# Patient Record
Sex: MALE | Race: OTHER | NOT HISPANIC OR LATINO | Employment: PART TIME | URBAN - METROPOLITAN AREA
[De-identification: names, ages, dates, MRNs, and addresses within clinical notes are randomized per-mention and may not be internally consistent; named-entity substitution may affect disease eponyms.]

---
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
## 2018-01-07 ENCOUNTER — OCCUPATIONAL MEDICINE (OUTPATIENT)
Dept: URGENT CARE | Facility: PHYSICIAN GROUP | Age: 18
End: 2018-01-07
Payer: MEDICARE

## 2018-01-07 VITALS
WEIGHT: 196.6 LBS | OXYGEN SATURATION: 95 % | SYSTOLIC BLOOD PRESSURE: 128 MMHG | HEART RATE: 86 BPM | HEIGHT: 68 IN | BODY MASS INDEX: 29.8 KG/M2 | TEMPERATURE: 99.2 F | DIASTOLIC BLOOD PRESSURE: 70 MMHG

## 2018-01-07 DIAGNOSIS — T23.161A BURN OF FIRST DEGREE OF BACK OF RIGHT HAND, INITIAL ENCOUNTER: ICD-10-CM

## 2018-01-07 PROCEDURE — 16000 INITIAL TREATMENT OF BURN(S): CPT | Mod: 29 | Performed by: PHYSICIAN ASSISTANT

## 2018-01-07 NOTE — PROGRESS NOTES
"Subjective:      Negrito Rivera is a 17 y.o. male who presents with Burn (R Hand burned w/coffee, occured today )      DOI: 1/7/8.  KEI: burnt right hand on hot coffee.  Right hand dominant.  Burned by hot coffee water and hot grounds.  On and off again pain.       HPI    Review of Systems   Skin:        Burn to hand   All other systems reviewed and are negative.         Objective:     /70   Pulse 86   Temp 37.3 °C (99.2 °F)   Ht 1.727 m (5' 7.99\")   Wt 89.2 kg (196 lb 9.6 oz)   SpO2 95%   BMI 29.90 kg/m²      Physical Exam  Nursing note and vital signs reviewed.    Constitutional:  Appropriately groomed, pleasant affect, well nourished, and in no acute distress.    HEENT:  Head: Atraumatic, normocephalic.    Eyes:  EOMs full.  Conjunctivae clear, sclera white, and medial canthus without exudate bilaterally.    Ears:  Hearing grossly intact to voice.    Neck:  FROM.  No anterior cervical chain lymphadenopathy. Thyroid nonpalpable, without masses or nodules. No supraclavicular lymphadenopathy to palpation.    Lungs:  Lungs with normal respiratory excursion and effort.      Muscle skeletal:  Gait and station wnl, non antalgic      Derm:  1 st degree burn over right hand on the dorsal aspect and base of index, middle, and ring.  Overall good turgor pressure.   Psychiatric:  Normal judgement, mood and affect.        Assessment/Plan:     1. Burn of first degree of back of right hand, initial encounter  First-degree burn to right hand dorsal aspect involving thenar eminence, base of first seconds and third fingers. Excludes little finger. Covered with Silvadene and Telfa and wrapped with rolled gauze and secured with Ace bandage. Recommended seeing oxide starting tomorrow changing twice daily. Keep covered. Work restrictions per D39. Patient to follow-up Wednesday or Thursday for reevaluation. Suspect discharge at that time.    Patient was in agreement with this treatment plan and seemed to understand " without barriers. All questions were encouraged and answered.  Reviewed signs and symptoms of when to seek emergency medical care.     Please note that this dictation was created using voice recognition software.  I have made every reasonable attempt to correct obvious errors, but I expect there are errors of valente and possibly content that I did not discover before finalizing the note.

## 2018-01-07 NOTE — LETTER
"EMPLOYEE’S CLAIM FOR COMPENSATION/ REPORT OF INITIAL TREATMENT  FORM C-4    EMPLOYEE’S CLAIM - PROVIDE ALL INFORMATION REQUESTED   First Name  Negrito Last Name  Miguel Birthdate                    2000                Sex  male Claim Number   Home Address  [place]  Age  17 y.o. Height  1.727 m (5' 7.99\") Weight  89.2 kg (196 lb 9.6 oz) [facility]     [facility] Zip  [address] Telephone  [phone] (home)    [place]  [facility] Zip  [address] Primary Language Spoken  English    Insurer   Third Party   [employer] Insurance   Employee's Occupation (Job Title) When Injury or Occupational Disease Occurred  [employer]    Employer's Name    [employer] Telephone  [phone]    Employer Address  [address]  [address]    Date of Injury  1/7/2018               Hour of Injury  9:00 AM Date Employer Notified  7/7/2018 Last Day of Work after Injury or Occupational Disease  1/7/2018 Supervisor to Whom Injury Reported  Darryl   Address or Location of Accident (if applicable)  [[facility]]   What were you doing at the time of accident? (if applicable)  Making coffee    How did this injury or occupational disease occur? (Be specific an answer in detail. Use additional sheet if necessary)  Spilled hot water on my hand    If you believe that you have an occupational disease, when did you first have knowledge of the disability and it relationship to your employment?  n/a Witnesses to the Accident  Sofi      Nature of Injury or Occupational Disease  Burn  Part(s) of Body Injured or Affected  Hand (R), ,     I certify that the above is true and correct to the best of my knowledge and that I have provided this information in order to obtain the benefits of [place] " inclusive or [place]).  I hereby authorize any physician, chiropractor, surgeon, practitioner, or other person, any hospital, including [facility] or [facility] hospital, any medical service organization, any insurance company, or other institution or organization to release to each other, any medical or other information, including benefits paid or payable, pertinent to this injury or disease, except information relative to diagnosis, treatment and/or counseling for AIDS, psychological conditions, alcohol or controlled substances, for which I must give specific authorization.  A Photostat of this authorization shall be as valid as the original.     Date   Place   Employee’s Signature   THIS REPORT MUST BE COMPLETED AND MAILED WITHIN 3 WORKING DAYS OF TREATMENT   Place  [facility]  Name of Facility  [place]   Date  1/7/2018 Diagnosis  (T23.161A) Burn of first degree of back of right hand, initial encounter Is there evidence the injured employee was under the influence of alcohol and/or another controlled substance at the time of accident?   Hour  1:15 PM Description of Injury or Disease  The encounter diagnosis was Burn of first degree of back of right hand, initial encounter. No   Treatment  Keep covered with zinc oxide.   Have you advised the patient to remain off work five days or more? No   X-Ray Findings      If Yes   From Date  To Date      From information given by the employee, together with medical evidence, can you directly connect this injury or occupational disease as job incurred?  Yes If No Full Duty  Yes Modified Duty  No   Is additional medical care by a physician indicated?  Yes If Modified Duty, Specify any Limitations / Restrictions  Minimal use of right hand.  No lifting with the right hand.   Do you know of any previous injury or disease contributing to this condition or occupational disease?                            No   Date  1/7/2018 Print  "Doctor’s Name Boston Raphael P.A.-C. I certify the employer’s copy of  this form was mailed on:   Address  [address] [place] [address]. [address] Insurer’s Use Only     [facility] Zip  [address]    Provider’s Tax ID Number  [id number] Telephone  Dept: [phone]        e-BOSTON Alas P.A.-C.   e-Signature: Dr. Juan Pickard, Medical Director Degree  AVELINO        ORIGINAL-TREATING PHYSICIAN OR CHIROPRACTOR    PAGE 2-INSURER/TPA    PAGE 3-EMPLOYER    PAGE 4-EMPLOYEE             Form C-4 (rev10/07)              BRIEF DESCRIPTION OF RIGHTS AND BENEFITS  (Pursuant to [place] 616C.050)    Notice of Injury or Occupational Disease (Incident Report Form C-1): If an injury or occupational disease (OD) arises out of and in the  course of employment, you must provide written notice to your employer as soon as practicable, but no later than 7 days after the accident or  OD. Your employer shall maintain a sufficient supply of the required forms.    Claim for Compensation (Form C-4): If medical treatment is sought, the form C-4 is available at the place of initial treatment. A completed  \"Claim for Compensation\" (Form C-4) must be filed within 90 days after an accident or OD. The treating physician or chiropractor must,  within 3 working days after treatment, complete and mail to the employer, the employer's insurer and third-party , the Claim for  Compensation.    Medical Treatment: If you require medical treatment for your on-the-job injury or OD, you may be required to select a physician or  chiropractor from a list provided by your workers’ compensation insurer, if it has contracted with an Organization for Managed Care (MCO) or  Preferred Provider Organization (PPO) or providers of health care. If your employer has not entered into a contract with an MCO or PPO, you  may select a physician or chiropractor from the Panel of Physicians and Chiropractors. Any medical costs related to your industrial injury " or  OD will be paid by your insurer.    Temporary Total Disability (TTD): If your doctor has certified that you are unable to work for a period of at least 5 consecutive days, or 5  cumulative days in a 20-day period, or places restrictions on you that your employer does not accommodate, you may be entitled to TTD  compensation.    Temporary Partial Disability (TPD): If the wage you receive upon reemployment is less than the compensation for TTD to which you are  entitled, the insurer may be required to pay you TPD compensation to make up the difference. TPD can only be paid for a maximum of 24  months.    Permanent Partial Disability (PPD): When your medical condition is stable and there is an indication of a PPD as a result of your injury or  OD, within 30 days, your insurer must arrange for an evaluation by a rating physician or chiropractor to determine the degree of your PPD. The  amount of your PPD award depends on the date of injury, the results of the PPD evaluation and your age and wage.    Permanent Total Disability (PTD): If you are medically certified by a treating physician or chiropractor as permanently and totally disabled  and have been granted a PTD status by your insurer, you are entitled to receive monthly benefits not to exceed 66 2/3% of your average  monthly wage. The amount of your PTD payments is subject to reduction if you previously received a PPD award.    Vocational Rehabilitation Services: You may be eligible for vocational rehabilitation services if you are unable to return to the job due to a  permanent physical impairment or permanent restrictions as a result of your injury or occupational disease.    Transportation and Per Esvin Reimbursement: You may be eligible for travel expenses and per esvin associated with medical treatment.    Reopening: You may be able to reopen your claim if your condition worsens after claim closure.    Appeal Process: If you disagree with a written  determination issued by the insurer or the insurer does not respond to your request, you may  appeal to the Department of Administration, , by following the instructions contained in your determination letter. You must  appeal the determination within 70 days from the date of the determination letter at [address], [address], [place], [place]  [address], or [address], [address], [place], [place] [address]. If you disagree with the  decision, you may appeal to the  Department of Administration, . You must file your appeal within 30 days from the date of the  decision  letter at [address], [address], [place], [place] [address], or [address], [address], [place], [place] [address]. If you  disagree with a decision of an , you may file a petition for judicial review with the District Court. You must do so within 30  days of the Appeal Officer’s decision. You may be represented by an  at your own expense or you may contact the [facility] for possible  representation.    [place]  for Injured Workers (NAIW): If you disagree with a  decision, you may request that NAIW represent you  without charge at an  Hearing. For information regarding denial of benefits, you may contact the [facility] at: [address], [address], [place] [address], [phone], or [address], [place] [address], [phone]    To File a Complaint with the Division: If you wish to file a complaint with the  of the Division of Industrial Relations (DIR),  please contact the Workers’ Compensation Section, [address], [address], [place], [place] [address], telephone [phone], or  [address], [facility] [address], [place] [address], telephone [phone].    For assistance with Workers’ Compensation Issues: you may contact the  Office of the Governor Consumer Health Assistance, [address] [place] [address], [address], [place] [address], Toll Free 1-922.366.5046, Web site: http://govcha.[facility].nv., E-mail  Libby@[email or web address].[email or web address].                                                                                                                                                                                                                                   __________________________________________________________________                                                                   _________________                Employee Name / Signature                                                                                                                                                       Date                                                                                                                                                                                                     D-2 (rev. 10/07)

## 2018-01-07 NOTE — LETTER
[facility]  [address] [place] [address]. [address] - [place] [address]  Phone:  [phone] - Fax:  [phone]   [facility] Progress Report and Disability Certification  Date of Service: 1/7/2018   No Show:  No  Date / Time of Next Visit: 1/11/2018   Claim Information   Patient Name: Negrito Rivera  Claim Number:     Employer:   [employer] Date of Injury: 1/7/2018     Insurer / TPA: [employer]  ID / SSN:     Occupation:   Diagnosis: The encounter diagnosis was Burn of first degree of back of right hand, initial encounter.    Medical Information   Related to Industrial Injury? Yes    Subjective Complaints:  DOI: 1/7/8.  KEI: burnt right hand on hot coffee.  Right hand dominant.  Burned by hot coffee water and hot grounds.  On and off again pain.     Objective Findings: Derm:  1 st degree burn over right hand on the dorsal aspect and base of index, middle, and ring.  Overall good turgor pressure.    Pre-Existing Condition(s):     Assessment:   Initial Visit    Status: Additional Care Required  Permanent Disability:No    Plan:   Comments:Keep covered with zinc oxide.      Diagnostics:      Comments:       Disability Information   Status:      From:  1/7/2018  Through: 1/11/2018 Restrictions are: Temporary   Physical Restrictions   Sitting:    Standing:    Stooping:    Bending:      Squatting:    Walking:    Climbing:    Pushing:      Pulling:    Other:    Reaching Above Shoulder (L):   Reaching Above Shoulder (R):       Reaching Below Shoulder (L):    Reaching Below Shoulder (R):      Not to exceed Weight Limits   Carrying(hrs):   Weight Limit(lb):   Lifting(hrs):   Weight  Limit(lb):     Comments: Minimal use of right hand.  No lifting with the right hand.    Repetitive Actions   Hands: i.e. Fine Manipulations from Grasping:     Feet: i.e. Operating Foot Controls:     Driving / Operate Machinery:     Physician Name: Boston Raphael  ALETA Physician Signature: ETHAN Fuentes P.A.-C. e-Signature: Dr. Juan Pickard, Medical Director   Clinic Name / Location: [address]  [place] [address] Clinic Phone Number: Dept: [phone]   Appointment Time: 12:25 Pm Visit Start Time: 1:15 PM   Check-In Time:  12:43 Pm Visit Discharge Time:  2:20pm   Original-Treating Physician or Chiropractor    Page 2-Insurer/TPA    Page 3-Employer    Page 4-Employee

## 2018-01-07 NOTE — PATIENT INSTRUCTIONS
Burn Care  Your skin is a natural barrier to infection. It is the largest organ of your body. Burns damage this natural protection. To help prevent infection, it is very important to follow your caregiver's instructions in the care of your burn.  Burns are classified as:  · First degree. There is only redness of the skin (erythema). No scarring is expected.  · Second degree. There is blistering of the skin. Scarring may occur with deeper burns.  · Third degree. All layers of the skin are injured, and scarring is expected.  HOME CARE INSTRUCTIONS   · Wash your hands well before changing your bandage.  · Change your bandage as often as directed by your caregiver.  ¨ Remove the old bandage. If the bandage sticks, you may soak it off with cool, clean water.  ¨ Cleanse the burn thoroughly but gently with mild soap and water.  ¨ Pat the area dry with a clean, dry cloth.  ¨ Apply a thin layer of antibacterial cream to the burn.  ¨ Apply a clean bandage as instructed by your caregiver.  ¨ Keep the bandage as clean and dry as possible.  · Elevate the affected area for the first 24 hours, then as instructed by your caregiver.  · Only take over-the-counter or prescription medicines for pain, discomfort, or fever as directed by your caregiver.  SEEK IMMEDIATE MEDICAL CARE IF:   · You develop excessive pain.  · You develop redness, tenderness, swelling, or red streaks near the burn.  · The burned area develops yellowish-white fluid (pus) or a bad smell.  · You have a fever.  MAKE SURE YOU:   · Understand these instructions.  · Will watch your condition.  · Will get help right away if you are not doing well or get worse.     This information is not intended to replace advice given to you by your health care provider. Make sure you discuss any questions you have with your health care provider.     Document Released: 12/18/2006 Document Revised: 03/11/2013 Document Reviewed: 05/09/2012  [employer] Interactive Patient Education ©2016  [employer] Inc.

## 2018-01-09 ENCOUNTER — OFFICE VISIT (OUTPATIENT)
Dept: URGENT CARE | Facility: PHYSICIAN GROUP | Age: 18
End: 2018-01-09
Payer: COMMERCIAL

## 2018-01-09 VITALS
HEIGHT: 68 IN | RESPIRATION RATE: 16 BRPM | HEART RATE: 120 BPM | OXYGEN SATURATION: 97 % | TEMPERATURE: 98.9 F | BODY MASS INDEX: 29.7 KG/M2 | SYSTOLIC BLOOD PRESSURE: 126 MMHG | WEIGHT: 196 LBS | DIASTOLIC BLOOD PRESSURE: 70 MMHG

## 2018-01-09 DIAGNOSIS — R68.89 FLU-LIKE SYMPTOMS: ICD-10-CM

## 2018-01-09 PROCEDURE — 99214 OFFICE O/P EST MOD 30 MIN: CPT | Performed by: NURSE PRACTITIONER

## 2018-01-09 ASSESSMENT — ENCOUNTER SYMPTOMS
VOMITING: 0
FEVER: 1
RHINORRHEA: 1
NAUSEA: 1
COUGH: 1
CHILLS: 1
SPUTUM PRODUCTION: 1
WEAKNESS: 1
DIARRHEA: 0
SHORTNESS OF BREATH: 0
SORE THROAT: 1
MYALGIAS: 1

## 2018-01-09 NOTE — PROGRESS NOTES
"Subjective:      Negrito Rivera is a 17 y.o. male who presents with Flu Like Symptoms (body aches, cough, head and neck pain - onset this AM )            Medications, Allergies and Prior Medical Hx reviewed and updated in [facility].with patient/family today         Cough   This is a new problem. The current episode started today. The problem has been gradually worsening. The cough is productive of sputum. Associated symptoms include chills, a fever, myalgias, nasal congestion, rhinorrhea and a sore throat. Pertinent negatives include no ear pain or shortness of breath. Nothing aggravates the symptoms. He has tried nothing for the symptoms. The treatment provided no relief. There is no history of asthma, bronchitis or pneumonia.       Review of Systems   Constitutional: Positive for chills, fever and malaise/fatigue.   HENT: Positive for congestion, rhinorrhea and sore throat. Negative for ear discharge and ear pain.    Respiratory: Positive for cough and sputum production. Negative for shortness of breath.    Gastrointestinal: Positive for nausea. Negative for diarrhea and vomiting.   Musculoskeletal: Positive for myalgias.   Neurological: Positive for weakness.          Objective:     /70   Pulse (!) 120   Temp 37.2 °C (98.9 °F)   Resp 16   Ht 1.727 m (5' 7.99\")   Wt 88.9 kg (196 lb)   SpO2 97%   BMI 29.81 kg/m²      Physical Exam   Constitutional: He appears well-developed and well-nourished. No distress.   HENT:   Head: Normocephalic and atraumatic.   Right Ear: Tympanic membrane and ear canal normal.   Left Ear: Tympanic membrane and ear canal normal.   Nose: Rhinorrhea present.   Mouth/Throat: Uvula is midline and mucous membranes are normal. No trismus in the jaw. No uvula swelling. Posterior oropharyngeal edema and posterior oropharyngeal erythema present. No oropharyngeal exudate.   Eyes: Conjunctivae are normal. Pupils are equal, round, and reactive to light.   Neck: Neck supple. "   Cardiovascular: Normal rate, regular rhythm and normal heart sounds.    Pulmonary/Chest: Effort normal and breath sounds normal. No respiratory distress. He has no wheezes.   Lymphadenopathy:     He has cervical adenopathy.   Neurological: He is alert.   Skin: Skin is warm and dry. Capillary refill takes less than 2 seconds.   Psychiatric: He has a normal mood and affect. His behavior is normal.   Vitals reviewed.              Assessment/Plan:       1. Flu-like symptoms         Letter written for 3 days off of school/work    Modified Epic generated written imformation provided along with verbal instructions    Rest, Fluids, tylenol, ibuprofen,  humidified air, and otc decongestants  Pt will go to the ER for worsening or changing symptoms as discussed,   Follow-up with your primary care provider or return here if not improving in 5 - 7 days   Discharge instructions discussed with pt/family who verbalize understanding and agreement with poc

## 2018-01-09 NOTE — LETTER
January 9, 2018         Patient: Negrito Rivera   YOB: 2000   Date of Visit: 1/9/2018           To Whom it May Concern:    Negrito Rivera was seen in my clinic on 1/9/2018. He should be off work for 3 days due to illness.     If you have any questions or concerns, please don't hesitate to call.        Sincerely,           VLAD Mar.  Electronically Signed

## 2018-01-10 NOTE — PATIENT INSTRUCTIONS
"Influenza, Adult  Influenza (\"the flu\") is a viral infection of the respiratory tract. It occurs more often in winter months because people spend more time in close contact with one another. Influenza can make you feel very sick. Influenza easily spreads from person to person (contagious).  CAUSES   Influenza is caused by a virus that infects the respiratory tract. You can catch the virus by breathing in droplets from an infected person's cough or sneeze. You can also catch the virus by touching something that was recently contaminated with the virus and then touching your mouth, nose, or eyes.  RISKS AND COMPLICATIONS  You may be at risk for a more severe case of influenza if you smoke cigarettes, have diabetes, have chronic heart disease (such as heart failure) or lung disease (such as asthma), or if you have a weakened immune system. Elderly people and pregnant women are also at risk for more serious infections. The most common problem of influenza is a lung infection (pneumonia). Sometimes, this problem can require emergency medical care and may be life threatening.  SIGNS AND SYMPTOMS   Symptoms typically last 4 to 10 days and may include:  · Fever.  · Chills.  · Headache, body aches, and muscle aches.  · Sore throat.  · Chest discomfort and cough.  · Poor appetite.  · Weakness or feeling tired.  · Dizziness.  · Nausea or vomiting.  DIAGNOSIS   Diagnosis of influenza is often made based on your history and a physical exam. A nose or throat swab test can be done to confirm the diagnosis.  TREATMENT   In mild cases, influenza goes away on its own. Treatment is directed at relieving symptoms. For more severe cases, your health care provider may prescribe antiviral medicines to shorten the sickness. Antibiotic medicines are not effective because the infection is caused by a virus, not by bacteria.  HOME CARE INSTRUCTIONS  · Take medicines only as directed by your health care provider.  · Use a cool mist humidifier " to make breathing easier.  · Get plenty of rest until your temperature returns to normal. This usually takes 3 to 4 days.  · Drink enough fluid to keep your urine clear or pale yellow.  · Cover your mouth and nose when coughing or sneezing, and wash your hands well to prevent the virus from spreading.  · Stay home from work or school until the fever is gone for at least 1 full day.  PREVENTION   An annual influenza vaccination (flu shot) is the best way to avoid getting influenza. An annual flu shot is now routinely recommended for all adults in the [place]  SEEK MEDICAL CARE IF:  · You experience chest pain, your cough worsens, or you produce more mucus.  · You have nausea, vomiting, or diarrhea.  · Your fever returns or gets worse.  SEEK IMMEDIATE MEDICAL CARE IF:  · You have trouble breathing, you become short of breath, or your skin or nails become bluish.  · You have severe pain or stiffness in the neck.  · You develop a sudden headache, or pain in the face or ear.  · You have nausea or vomiting that you cannot control.  MAKE SURE YOU:   · Understand these instructions.  · Will watch your condition.  · Will get help right away if you are not doing well or get worse.     This information is not intended to replace advice given to you by your health care provider. Make sure you discuss any questions you have with your health care provider.     Document Released: 12/15/2001 Document Revised: 01/08/2016 Document Reviewed: 03/18/2013  [employer] Interactive Patient Education ©2016 [employer]

## 2018-01-11 ENCOUNTER — OCCUPATIONAL MEDICINE (OUTPATIENT)
Dept: URGENT CARE | Facility: PHYSICIAN GROUP | Age: 18
End: 2018-01-11
Payer: MEDICARE

## 2018-01-11 VITALS
HEART RATE: 109 BPM | HEIGHT: 68 IN | DIASTOLIC BLOOD PRESSURE: 50 MMHG | RESPIRATION RATE: 16 BRPM | TEMPERATURE: 102 F | SYSTOLIC BLOOD PRESSURE: 86 MMHG | BODY MASS INDEX: 29.7 KG/M2 | OXYGEN SATURATION: 95 % | WEIGHT: 196 LBS

## 2018-01-11 DIAGNOSIS — T23.161D: ICD-10-CM

## 2018-01-11 PROCEDURE — 99213 OFFICE O/P EST LOW 20 MIN: CPT | Mod: 29 | Performed by: PHYSICIAN ASSISTANT

## 2018-01-11 NOTE — LETTER
[facility]  [address] - [place] [address]  Phone:  [phone] - Fax:  [phone]   [facility] Progress Report and Disability Certification  Date of Service: 1/11/2018   No Show:  No  Date / Time of Next Visit:     Claim Information   Patient Name: Negrito Rivera  Claim Number:     Employer:   *** Date of Injury: 1/7/2018     Insurer / TPA: [employer] Insurance *** ID / SSN:     Occupation:  *** Diagnosis: The encounter diagnosis was Burn of first degree of back of right hand, subsequent encounter.    Medical Information   Related to Industrial Injury? Yes ***   Subjective Complaints:  DOI: 01/07/2018.  PT here for follow up of burn to right hand from hot water/coffee/grounds.  PT states burn is mostly healed, there are a few scabs now.  No drainage, warmth or signs of infection.  PT states FROM of hand.  Would like to return to work MMI.    Objective Findings: Right hand exam reveals a few small scattered scabs on thumb and wrist, radial aspect.  Otherwise, no broken skin. Slightly sensitive to pressure.  FROM of thumb and wrist.  Distal neuro/vascular intact.      Pre-Existing Condition(s):     Assessment:   Condition Improved    Status: Discharged /  MMI  Permanent Disability:No    Plan:      Diagnostics:      Comments:       Disability Information   Status:      From:     Through:   Restrictions are:     Physical Restrictions   Sitting:    Standing:    Stooping:    Bending:      Squatting:    Walking:    Climbing:    Pushing:      Pulling:    Other:    Reaching Above Shoulder (L):   Reaching Above Shoulder (R):       Reaching Below Shoulder (L):    Reaching Below Shoulder (R):      Not to exceed Weight Limits   Carrying(hrs):   Weight Limit(lb):   Lifting(hrs):   Weight  Limit(lb):     Comments: Return to work without restrictions.     Repetitive Actions   Hands: i.e. Fine Manipulations from Grasping:        Feet: i.e. Operating Foot Controls:     Driving / Operate Machinery:     Physician Name: Swetha Pina P.A.-C. Physician Signature: SWETHA Villa P.A.-C. e-Signature: Dr. Juan Pickard, Medical Director   Clinic Name / Location: [address]  [place] [address] Clinic Phone Number: Dept: [phone]   Appointment Time: 9:00 Am Visit Start Time: 9:18 AM   Check-In Time:  9:11 Am Visit Discharge Time:  ***   Original-Treating Physician or Chiropractor    Page 2-Insurer/TPA    Page 3-Employer    Page 4-Employee

## 2018-01-11 NOTE — LETTER
[facility]  [address] - [place] [address]  Phone:  [phone] - Fax:  [phone]   [facility] Progress Report and Disability Certification  Date of Service: 1/11/2018   No Show:  No  Date / Time of Next Visit:     Claim Information   Patient Name: Negrito Rivera  Claim Number:     Employer:   [employer] Date of Injury: 1/7/2018     Insurer / TPA: [employer]  ID / SSN:     Occupation:   Diagnosis: The encounter diagnosis was Burn of first degree of back of right hand, subsequent encounter.    Medical Information   Related to Industrial Injury? Yes   Subjective Complaints:  DOI: 01/07/2018.  PT here for follow up of burn to right hand from hot water/coffee/grounds.  PT states burn is mostly healed, there are a few scabs now.  No drainage, warmth or signs of infection.  PT states FROM of hand.  Would like to return to work MMI.    Objective Findings: Right hand exam reveals a few small scattered scabs on thumb and wrist, radial aspect.  Otherwise, no broken skin. Slightly sensitive to pressure.  FROM of thumb and wrist.  Distal neuro/vascular intact.      Pre-Existing Condition(s):     Assessment:   Condition Improved    Status: Discharged /  MMI  Permanent Disability:No    Plan:      Diagnostics:      Comments:       Disability Information   Status:      From:     Through:   Restrictions are:     Physical Restrictions   Sitting:    Standing:    Stooping:    Bending:      Squatting:    Walking:    Climbing:    Pushing:      Pulling:    Other:    Reaching Above Shoulder (L):   Reaching Above Shoulder (R):       Reaching Below Shoulder (L):    Reaching Below Shoulder (R):      Not to exceed Weight Limits   Carrying(hrs):   Weight Limit(lb):   Lifting(hrs):   Weight  Limit(lb):     Comments: Return to work without restrictions.     Repetitive Actions   Hands: i.e. Fine Manipulations from Grasping:     Feet:  i.e. Operating Foot Controls:     Driving / Operate Machinery:     Physician Name: Swetha Pina P.A.-C. Physician Signature: SWETHA Villa P.A.-C. e-Signature: Dr. Juan Pickard, Medical Director   Clinic Name / Location: [address]  [place] [address] Clinic Phone Number: Dept: [phone]   Appointment Time: 9:00 Am Visit Start Time: 9:18 AM   Check-In Time:  9:11 Am Visit Discharge Time: 9:58 AM   Original-Treating Physician or Chiropractor    Page 2-Insurer/TPA    Page 3-Employer    Page 4-Employee

## 2018-01-11 NOTE — LETTER
[facility]  [address] - [place] [address]  Phone:  [phone] - Fax:  [phone]   [facility] Progress Report and Disability Certification  Date of Service: 1/11/2018   No Show:  No  Date / Time of Next Visit:     Claim Information   Patient Name: Negrito Rivera  Claim Number:     Employer:   *** Date of Injury: 1/7/2018     Insurer / TPA: [employer] *** ID / SSN:     Occupation:  *** Diagnosis: The encounter diagnosis was Burn of first degree of back of right hand, subsequent encounter.    Medical Information   Related to Industrial Injury? Yes ***   Subjective Complaints:  DOI: 01/07/2018.  PT here for follow up of burn to right hand from hot water/coffee/grounds.  PT states burn is mostly healed, there are a few scabs now.  No drainage, warmth or signs of infection.  PT states FROM of hand.  Would like to return to work MMI.    Objective Findings: Right hand exam reveals a few small scattered scabs on thumb and wrist, radial aspect.  Otherwise, no broken skin. Slightly sensitive to pressure.  FROM of thumb and wrist.  Distal neuro/vascular intact.      Pre-Existing Condition(s):     Assessment:   Condition Improved    Status: Discharged /  MMI  Permanent Disability:No    Plan:      Diagnostics:      Comments:       Disability Information   Status:      From:     Through:   Restrictions are:     Physical Restrictions   Sitting:    Standing:    Stooping:    Bending:      Squatting:    Walking:    Climbing:    Pushing:      Pulling:    Other:    Reaching Above Shoulder (L):   Reaching Above Shoulder (R):       Reaching Below Shoulder (L):    Reaching Below Shoulder (R):      Not to exceed Weight Limits   Carrying(hrs):   Weight Limit(lb):   Lifting(hrs):   Weight  Limit(lb):     Comments: Return to work without restrictions.     Repetitive Actions   Hands: i.e. Fine Manipulations from Grasping:        Feet: i.e. Operating Foot Controls:     Driving / Operate Machinery:     Physician Name: Swetha Pina P.A.-C. Physician Signature: SWETHA Villa P.A.-C. e-Signature: Dr. Juan Pickard, Medical Director   Clinic Name / Location: [address]  [place] [address] Clinic Phone Number: Dept: [phone]   Appointment Time: 9:00 Am Visit Start Time: 9:18 AM   Check-In Time:  9:11 Am Visit Discharge Time:  ***   Original-Treating Physician or Chiropractor    Page 2-Insurer/TPA    Page 3-Employer    Page 4-Employee

## 2018-01-14 NOTE — PROGRESS NOTES
"Subjective:      Negrito Rivera is a 17 y.o. male who presents with Follow-Up ([facility] DOI 1/7/2018 burn RT hand [place].  PT states since last visit, his hand feels a lot better.  No consistant pain, sensitive to touch.)    Pt PMH, SocHx, SurgHx, FamHx, Drug allergies and medications reviewed with pt/EPIC.      Family history reviewed, it is not pertinent to this complaint.     DOI: 01/07/2018.  PT here for follow up of burn to right hand from hot water/coffee/grounds.  PT states burn is mostly healed, there are a few scabs now.  No drainage, warmth or signs of infection.  PT states FROM of hand.  Would like to return to work MMI.      Pt presents for work comp follow up, would like to be released MMI.         Review of Systems   Skin:        Burn to hand   All other systems reviewed and are negative.         Objective:     BP (!) 86/50   Pulse (!) 109   Temp (!) 38.9 °C (102 °F)   Resp 16   Ht 1.727 m (5' 8\")   Wt 88.9 kg (196 lb)   SpO2 95%   BMI 29.80 kg/m²      Physical Exam   Constitutional: He is oriented to person, place, and time. He appears well-developed and well-nourished. No distress.   HENT:   Head: Normocephalic and atraumatic.   Nose: Nose normal.   Eyes: Conjunctivae and EOM are normal. Pupils are equal, round, and reactive to light.   Neck: Normal range of motion. Neck supple. No JVD present.   Cardiovascular: Normal rate and intact distal pulses.    Pulmonary/Chest: Effort normal.   Abdominal: Soft.   Lymphadenopathy:     He has no cervical adenopathy.   Neurological: He is alert and oriented to person, place, and time.   Skin: Skin is warm and dry. Capillary refill takes less than 2 seconds.   Nursing note and vitals reviewed.      Right hand exam reveals a few small scattered scabs on thumb and wrist, radial aspect.  Otherwise, no broken skin. Slightly sensitive to pressure.  FROM of thumb and wrist.  Distal neuro/vascular intact.          Assessment/Plan:     1. Burn of first degree " of back of right hand, subsequent encounter       Discharged MMI.     PT should follow up with PCP in 1-2 days for re-evaluation if symptoms have not improved.  Discussed red flags and reasons to return to UC or ED.  Pt and/or family verbalized understanding of diagnosis and follow up instructions and was offered informational handout on diagnosis.  PT discharged.

## 2020-06-09 ENCOUNTER — TELEPHONE (OUTPATIENT)
Dept: SCHEDULING | Facility: IMAGING CENTER | Age: 20
End: 2020-06-09

## 2020-06-17 ENCOUNTER — OFFICE VISIT (OUTPATIENT)
Dept: MEDICAL GROUP | Facility: LAB | Age: 20
End: 2020-06-17
Payer: COMMERCIAL

## 2020-06-17 VITALS
SYSTOLIC BLOOD PRESSURE: 120 MMHG | WEIGHT: 207 LBS | RESPIRATION RATE: 12 BRPM | OXYGEN SATURATION: 98 % | BODY MASS INDEX: 31.37 KG/M2 | DIASTOLIC BLOOD PRESSURE: 82 MMHG | HEIGHT: 68 IN | HEART RATE: 80 BPM | TEMPERATURE: 98.4 F

## 2020-06-17 DIAGNOSIS — Z00.00 WELL ADULT EXAM: ICD-10-CM

## 2020-06-17 DIAGNOSIS — E66.9 OBESITY (BMI 30-39.9): ICD-10-CM

## 2020-06-17 DIAGNOSIS — Z23 NEED FOR VACCINATION: ICD-10-CM

## 2020-06-17 PROCEDURE — 90651 9VHPV VACCINE 2/3 DOSE IM: CPT | Performed by: FAMILY MEDICINE

## 2020-06-17 PROCEDURE — 90471 IMMUNIZATION ADMIN: CPT | Performed by: FAMILY MEDICINE

## 2020-06-17 PROCEDURE — 90472 IMMUNIZATION ADMIN EACH ADD: CPT | Performed by: FAMILY MEDICINE

## 2020-06-17 PROCEDURE — 90621 MENB-FHBP VACC 2/3 DOSE IM: CPT | Performed by: FAMILY MEDICINE

## 2020-06-17 PROCEDURE — 99385 PREV VISIT NEW AGE 18-39: CPT | Mod: 25 | Performed by: FAMILY MEDICINE

## 2020-06-17 RX ORDER — MULTIVIT WITH MINERALS/LUTEIN
TABLET ORAL
COMMUNITY

## 2020-06-17 RX ORDER — UREA 10 %
LOTION (ML) TOPICAL
COMMUNITY

## 2020-06-17 RX ORDER — NAPROXEN SODIUM 220 MG
220 TABLET ORAL 2 TIMES DAILY WITH MEALS
COMMUNITY

## 2020-06-17 SDOH — HEALTH STABILITY: MENTAL HEALTH: HOW OFTEN DO YOU HAVE A DRINK CONTAINING ALCOHOL?: 2-3 TIMES A WEEK

## 2020-06-17 ASSESSMENT — ENCOUNTER SYMPTOMS
FOCAL WEAKNESS: 0
FEVER: 0
DIARRHEA: 0
CONSTIPATION: 0
DIZZINESS: 0
SORE THROAT: 0
SHORTNESS OF BREATH: 0
MYALGIAS: 0
COUGH: 0
DEPRESSION: 0
HEADACHES: 0
WHEEZING: 0
CHILLS: 0
BLURRED VISION: 0
PALPITATIONS: 0
VOMITING: 0
ABDOMINAL PAIN: 0
NAUSEA: 0

## 2020-06-17 ASSESSMENT — PATIENT HEALTH QUESTIONNAIRE - PHQ9: CLINICAL INTERPRETATION OF PHQ2 SCORE: 0

## 2020-06-17 NOTE — PROGRESS NOTES
Negrito Rivera is a 19 y.o. male here to establish care and discuss weight.    HPI:      Weight  Working on intermittent fasting, less processed carbs more fruit and veggies. Would like to lose ~ 20 pounds.  Going to gym 4-5 x times weekly    Health maintenance   at [facility], going to [facility]  Alcohol - few drinks 2-3 times weekly  Sexually active, in a relationship, defers STD screening  Defers screening labs today    Current medicines (including changes today)  Current Outpatient Medications   Medication Sig Dispense Refill   • naproxen (ALEVE) 220 MG tablet Take 220 mg by mouth 2 times a day, with meals.     • Ascorbic Acid (VITAMIN C) 1000 MG Tab Take  by mouth.     • Cyanocobalamin (VITAMIN B12) 100 MCG Tab Take  by mouth.     • Multiple Vitamins-Minerals (MULTIVITAMIN ADULT PO) Take  by mouth.       No current facility-administered medications for this visit.      He  has no past medical history of ASTHMA, CAD (coronary artery disease), Cancer (CMS-[facility]) ([facility]), Congestive heart failure (CMS-HCC) ([facility]), COPD, Diabetes, Hypertension, Infectious disease, Liver disease, Psychiatric disorder, Renal disorder, Seizure disorder (CMS-[facility]) ([facility]), or Stroke (CMS-HCC) ([facility]).  He  has a past surgical history that includes tonsillectomy and adenoidectomy (12/29/2009).  Social History     Tobacco Use   • Smoking status: Never Smoker   • Smokeless tobacco: Never Used   Substance Use Topics   • Alcohol use: Yes     Frequency: 2-3 times a week   • Drug use: No     Social History     Social History Narrative   • Not on file     No family history on file.  No family status information on file.       ROS  Review of Systems   Constitutional: Negative for chills and fever.   HENT: Negative for congestion and sore throat.    Eyes: Negative for blurred vision.   Respiratory: Negative for cough, shortness of breath and wheezing.    Cardiovascular: Negative for chest pain and palpitations.   Gastrointestinal: Negative for  "abdominal pain, constipation, diarrhea, nausea and vomiting.   Genitourinary: Negative for dysuria and urgency.   Musculoskeletal: Negative for joint pain and myalgias.   Skin: Negative for rash.   Neurological: Negative for dizziness, focal weakness and headaches.   Psychiatric/Behavioral: Negative for depression.   All other systems reviewed and are negative.        Objective:     Physical Exam:  /82 (BP Location: Right arm, Patient Position: Sitting, BP Cuff Size: Adult)   Pulse 80   Temp 36.9 °C (98.4 °F)   Resp 12   Ht 1.727 m (5' 8\")   Wt 93.9 kg (207 lb)   SpO2 98%  Body mass index is 31.47 kg/m².  Constitutional: Alert. Well appearing. No distress.  Skin: Warm, dry, good turgor, no visible rashes.  Eye: Equal, round and reactive to light, conjunctiva clear, lids normal.  ENMT: Moist mucous membranes.   Neck: Trachea midline, no masses, no thyromegaly.   Respiratory: Normal effort. Lungs are clear to auscultation bilaterally.  Cardiovascular: Regular rate and rhythm. Normal S1/S2. No murmurs, rubs or gallops.   Neuro: Moves all four extremities. No facial droop.  Psych: Answers questions appropriately. Normal affect and mood.    Assessment and Plan:     1. Obesity (BMI 30-39.9)  Discussed diet and lifestyle modifications.    2. Well adult exam  Health maintenance reviewed and updated.  Vaccinations as below.  Defers labs today.  Age-appropriate anticipatory guidance provided.    3. Need for vaccination  - 9VHPV Vaccine 2-3 Dose IM  - Meningococcal Vaccine (IM) Group B    Follow up: Return in about 1 year (around 6/17/2021).         PLEASE NOTE: This note was created using voice recognition software.   "

## 2020-12-18 ENCOUNTER — NON-PROVIDER VISIT (OUTPATIENT)
Dept: MEDICAL GROUP | Facility: LAB | Age: 20
End: 2020-12-18
Payer: COMMERCIAL

## 2020-12-18 DIAGNOSIS — Z23 NEED FOR VACCINATION: ICD-10-CM

## 2020-12-18 PROCEDURE — 90472 IMMUNIZATION ADMIN EACH ADD: CPT | Performed by: FAMILY MEDICINE

## 2020-12-18 PROCEDURE — 90715 TDAP VACCINE 7 YRS/> IM: CPT | Performed by: FAMILY MEDICINE

## 2020-12-18 PROCEDURE — 90471 IMMUNIZATION ADMIN: CPT | Performed by: FAMILY MEDICINE

## 2020-12-18 PROCEDURE — 90621 MENB-FHBP VACC 2/3 DOSE IM: CPT | Performed by: FAMILY MEDICINE

## 2020-12-18 PROCEDURE — 90651 9VHPV VACCINE 2/3 DOSE IM: CPT | Performed by: FAMILY MEDICINE

## 2020-12-19 NOTE — PROGRESS NOTES
"Negrito Rivera is a 20 y.o. male here for a non-provider visit for:   HPV 2 of 3    Reason for immunization: continue or complete series started at the office  Immunization records indicate need for vaccine: Yes, confirmed with Epic  Minimum interval has been met for this vaccine: Yes  ABN completed: Yes    Order and dose verified by: ar  VIS Dated   was given to patient: Yes  All IAC Questionnaire questions were answered \"No.\"    Patient tolerated injection and no adverse effects were observed or reported: Yes    Pt scheduled for next dose in series: No    Negrito Rivera is a 20 y.o. male here for a non-provider visit for:   TRUMENBA (Men B) 2 of 3    Reason for immunization: continue or complete series started at the office  Immunization records indicate need for vaccine: Yes, confirmed with Epic  Minimum interval has been met for this vaccine: Yes  ABN completed: Yes    Order and dose verified by: ar  VIS Dated   was given to patient: Yes  All IAC Questionnaire questions were answered \"No.\"    Patient tolerated injection and no adverse effects were observed or reported: Yes    Pt scheduled for next dose in series: No    Negrito Rivera is a 20 y.o. male here for a non-provider visit for:   TDAP    Reason for immunization: needed for work/school  Immunization records indicate need for vaccine: Yes, confirmed with Epic  Minimum interval has been met for this vaccine: Yes  ABN completed: Yes    Order and dose verified by: ar  VIS Dated   was given to patient: Yes  All IAC Questionnaire questions were answered \"No.\"    Patient tolerated injection and no adverse effects were observed or reported: Yes    Pt scheduled for next dose in series: No     "

## 2021-04-28 ENCOUNTER — IMMUNIZATION (OUTPATIENT)
Dept: FAMILY PLANNING/WOMEN'S HEALTH CLINIC | Facility: IMMUNIZATION CENTER | Age: 21
End: 2021-04-28
Payer: COMMERCIAL

## 2021-04-28 DIAGNOSIS — Z23 ENCOUNTER FOR VACCINATION: Primary | ICD-10-CM

## 2021-04-28 PROCEDURE — 0001A PFIZER SARS-COV-2 VACCINE: CPT | Performed by: INTERNAL MEDICINE

## 2021-04-28 PROCEDURE — 91300 PFIZER SARS-COV-2 VACCINE: CPT | Performed by: INTERNAL MEDICINE

## 2021-05-20 ENCOUNTER — IMMUNIZATION (OUTPATIENT)
Dept: FAMILY PLANNING/WOMEN'S HEALTH CLINIC | Facility: IMMUNIZATION CENTER | Age: 21
End: 2021-05-20
Payer: COMMERCIAL

## 2021-05-20 DIAGNOSIS — Z23 ENCOUNTER FOR VACCINATION: Primary | ICD-10-CM

## 2021-05-20 PROCEDURE — 91300 PFIZER SARS-COV-2 VACCINE: CPT | Performed by: INTERNAL MEDICINE

## 2021-05-20 PROCEDURE — 0002A PFIZER SARS-COV-2 VACCINE: CPT | Performed by: INTERNAL MEDICINE

## 2022-06-28 ENCOUNTER — OFFICE VISIT (OUTPATIENT)
Dept: URGENT CARE | Facility: PHYSICIAN GROUP | Age: 22
End: 2022-06-28
Payer: COMMERCIAL

## 2022-06-28 VITALS
HEIGHT: 68 IN | OXYGEN SATURATION: 96 % | SYSTOLIC BLOOD PRESSURE: 108 MMHG | RESPIRATION RATE: 20 BRPM | DIASTOLIC BLOOD PRESSURE: 64 MMHG | BODY MASS INDEX: 28.04 KG/M2 | WEIGHT: 185 LBS | TEMPERATURE: 99.3 F | HEART RATE: 92 BPM

## 2022-06-28 DIAGNOSIS — U07.1 COVID-19: ICD-10-CM

## 2022-06-28 DIAGNOSIS — Z20.822 SUSPECTED COVID-19 VIRUS INFECTION: ICD-10-CM

## 2022-06-28 LAB
EXTERNAL QUALITY CONTROL: ABNORMAL
SARS-COV+SARS-COV-2 AG RESP QL IA.RAPID: POSITIVE

## 2022-06-28 PROCEDURE — 87426 SARSCOV CORONAVIRUS AG IA: CPT | Performed by: PHYSICIAN ASSISTANT

## 2022-06-28 PROCEDURE — 99213 OFFICE O/P EST LOW 20 MIN: CPT | Mod: CS | Performed by: PHYSICIAN ASSISTANT

## 2022-06-28 ASSESSMENT — ENCOUNTER SYMPTOMS
HEMOPTYSIS: 0
RHINORRHEA: 1
MYALGIAS: 1
SORE THROAT: 1
SHORTNESS OF BREATH: 0
COUGH: 1
FEVER: 0
HEADACHES: 1

## 2022-06-28 NOTE — PROGRESS NOTES
Subjective:   Negrito Rivera is a 21 y.o. male who presents for Cough (Body aches,sore throat,runny nose,x1 day)        Mom positive for COVID-19.    Cough  This is a new problem. The current episode started yesterday. The problem has been gradually worsening. The cough is productive of sputum. Associated symptoms include headaches, myalgias, nasal congestion, rhinorrhea and a sore throat. Pertinent negatives include no fever, hemoptysis or shortness of breath. Associated symptoms comments: Weak, fatigued. He has tried nothing for the symptoms. The treatment provided no relief. There is no history of asthma or pneumonia.     Review of Systems   Constitutional: Negative for fever.   HENT: Positive for rhinorrhea and sore throat.    Respiratory: Positive for cough. Negative for hemoptysis and shortness of breath.    Musculoskeletal: Positive for myalgias.   Neurological: Positive for headaches.       PMH:  has no past medical history of ASTHMA, CAD (coronary artery disease), Cancer (HCC), Congestive heart failure (HCC), COPD, Diabetes, Hypertension, Infectious disease, Liver disease, Psychiatric disorder, Renal disorder, Seizure disorder ([facility]), or Stroke ([facility]).  MEDS:   Current Outpatient Medications:   •  naproxen (ANAPROX) 220 MG tablet, Take 220 mg by mouth 2 times a day, with meals. (Patient not taking: Reported on 6/28/2022), Disp: , Rfl:   •  Ascorbic Acid (VITAMIN C) 1000 MG Tab, Take  by mouth. (Patient not taking: Reported on 6/28/2022), Disp: , Rfl:   •  Cyanocobalamin (VITAMIN B12) 100 MCG Tab, Take  by mouth. (Patient not taking: Reported on 6/28/2022), Disp: , Rfl:   •  Multiple Vitamins-Minerals (MULTIVITAMIN ADULT PO), Take  by mouth. (Patient not taking: Reported on 6/28/2022), Disp: , Rfl:   ALLERGIES:   Allergies   Allergen Reactions   • Nkda [No Known Drug Allergy]      SURGHX:   Past Surgical History:   Procedure Laterality Date   • TONSILLECTOMY AND ADENOIDECTOMY  12/29/2009    Performed by  "CHALO HURTADO at SURGERY SAME DAY [facility]     SOCHX:  reports that he has never smoked. He has never used smokeless tobacco. He reports current alcohol use. He reports that he does not use drugs.  FH: Family history was reviewed, no pertinent findings to report   Objective:   /64 (BP Location: Right arm, Patient Position: Sitting, BP Cuff Size: Adult)   Pulse 92   Temp 37.4 °C (99.3 °F) (Temporal)   Resp 20   Ht 1.727 m (5' 8\")   Wt 83.9 kg (185 lb)   SpO2 96%   BMI 28.13 kg/m²   Physical Exam  Vitals reviewed.   Constitutional:       General: He is not in acute distress.     Appearance: Normal appearance. He is well-developed. He is not toxic-appearing.   HENT:      Head: Normocephalic and atraumatic.      Right Ear: External ear normal.      Left Ear: External ear normal.      Nose: Congestion and rhinorrhea present. Rhinorrhea is clear.      Mouth/Throat:      Lips: Pink.      Mouth: Mucous membranes are moist.      Pharynx: Oropharynx is clear. Uvula midline. Posterior oropharyngeal erythema (mild) present.   Cardiovascular:      Rate and Rhythm: Normal rate and regular rhythm.      Heart sounds: Normal heart sounds, S1 normal and S2 normal.   Pulmonary:      Effort: Pulmonary effort is normal. No respiratory distress.      Breath sounds: Normal breath sounds. No stridor. No decreased breath sounds, wheezing, rhonchi or rales.   Lymphadenopathy:      Cervical: No cervical adenopathy.      Right cervical: No superficial cervical adenopathy.     Left cervical: No superficial cervical adenopathy.   Skin:     General: Skin is dry.   Neurological:      Comments: Alert and oriented.    Psychiatric:         Speech: Speech normal.         Behavior: Behavior normal.           Assessment/Plan:   1. COVID-19    2. Suspected COVID-19 virus infection  - POCT SARS-COV Antigen BETTYE (Symptomatic only)    Patient's testing is positive for COVID-19.  Patient has a good understanding of etiology and disease course.  " Unfortunately he does not qualify for treatment with Paxlovid.  Discussed CDC quarantine guidelines and symptomatic home care.  All questions and concerns addressed.  Return precautions reviewed.    Differential diagnosis, natural history, supportive care, and indications for immediate follow-up discussed.

## 2024-07-12 ENCOUNTER — EH NON-PROVIDER (OUTPATIENT)
Dept: OCCUPATIONAL MEDICINE | Facility: CLINIC | Age: 24
End: 2024-07-12

## 2024-07-12 ENCOUNTER — HOSPITAL ENCOUNTER (OUTPATIENT)
Facility: MEDICAL CENTER | Age: 24
End: 2024-07-12
Attending: PREVENTIVE MEDICINE
Payer: COMMERCIAL

## 2024-07-12 ENCOUNTER — EMPLOYEE HEALTH (OUTPATIENT)
Dept: OCCUPATIONAL MEDICINE | Facility: CLINIC | Age: 24
End: 2024-07-12

## 2024-07-12 DIAGNOSIS — Z02.89 VISIT FOR OCCUPATIONAL HEALTH EXAMINATION: Primary | ICD-10-CM

## 2024-07-12 DIAGNOSIS — Z02.89 VISIT FOR OCCUPATIONAL HEALTH EXAMINATION: ICD-10-CM

## 2024-07-12 LAB
AMP AMPHETAMINE: NORMAL
BAR BARBITURATES: NORMAL
BZO BENZODIAZEPINES: NORMAL
COC COCAINE: NORMAL
INT CON NEG: NORMAL
INT CON POS: NORMAL
MDMA ECSTASY: NORMAL
MET METHAMPHETAMINES: NORMAL
MTD METHADONE: NORMAL
OPI OPIATES: NORMAL
OXY OXYCODONE: NORMAL
PCP PHENCYCLIDINE: NORMAL
POC URINE DRUG SCREEN OCDRS: NORMAL
THC: NORMAL

## 2024-07-12 PROCEDURE — 86480 TB TEST CELL IMMUN MEASURE: CPT | Performed by: PREVENTIVE MEDICINE

## 2024-07-12 PROCEDURE — 80305 DRUG TEST PRSMV DIR OPT OBS: CPT | Performed by: PREVENTIVE MEDICINE

## 2024-07-12 PROCEDURE — 94375 RESPIRATORY FLOW VOLUME LOOP: CPT | Performed by: PREVENTIVE MEDICINE

## 2024-07-12 PROCEDURE — 8915 PR COMPREHENSIVE PHYSICAL: Performed by: PREVENTIVE MEDICINE

## 2024-07-15 LAB
GAMMA INTERFERON BACKGROUND BLD IA-ACNC: 0.03 IU/ML
M TB IFN-G BLD-IMP: NEGATIVE
M TB IFN-G CD4+ BCKGRND COR BLD-ACNC: 0 IU/ML
MITOGEN IGNF BCKGRD COR BLD-ACNC: >10 IU/ML
QFT TB2 - NIL TBQ2: 0 IU/ML

## 2025-01-09 ENCOUNTER — OFFICE VISIT (OUTPATIENT)
Dept: URGENT CARE | Facility: PHYSICIAN GROUP | Age: 25
End: 2025-01-09

## 2025-01-09 VITALS
OXYGEN SATURATION: 99 % | HEART RATE: 102 BPM | DIASTOLIC BLOOD PRESSURE: 82 MMHG | HEIGHT: 67 IN | RESPIRATION RATE: 16 BRPM | BODY MASS INDEX: 30.64 KG/M2 | TEMPERATURE: 101.2 F | WEIGHT: 195.2 LBS | SYSTOLIC BLOOD PRESSURE: 120 MMHG

## 2025-01-09 DIAGNOSIS — R11.2 NAUSEA VOMITING AND DIARRHEA: ICD-10-CM

## 2025-01-09 DIAGNOSIS — R52 GENERALIZED BODY ACHES: ICD-10-CM

## 2025-01-09 DIAGNOSIS — R19.7 NAUSEA VOMITING AND DIARRHEA: ICD-10-CM

## 2025-01-09 DIAGNOSIS — R50.9 FEVER, UNSPECIFIED FEVER CAUSE: ICD-10-CM

## 2025-01-09 PROCEDURE — 3074F SYST BP LT 130 MM HG: CPT | Performed by: STUDENT IN AN ORGANIZED HEALTH CARE EDUCATION/TRAINING PROGRAM

## 2025-01-09 PROCEDURE — 1125F AMNT PAIN NOTED PAIN PRSNT: CPT | Performed by: STUDENT IN AN ORGANIZED HEALTH CARE EDUCATION/TRAINING PROGRAM

## 2025-01-09 PROCEDURE — 3079F DIAST BP 80-89 MM HG: CPT | Performed by: STUDENT IN AN ORGANIZED HEALTH CARE EDUCATION/TRAINING PROGRAM

## 2025-01-09 PROCEDURE — 99213 OFFICE O/P EST LOW 20 MIN: CPT | Performed by: STUDENT IN AN ORGANIZED HEALTH CARE EDUCATION/TRAINING PROGRAM

## 2025-01-09 RX ORDER — ONDANSETRON 4 MG/1
4 TABLET, ORALLY DISINTEGRATING ORAL EVERY 6 HOURS PRN
Qty: 15 TABLET | Refills: 0 | Status: SHIPPED | OUTPATIENT
Start: 2025-01-09

## 2025-01-09 ASSESSMENT — ENCOUNTER SYMPTOMS
CHILLS: 1
PALPITATIONS: 0
CONSTIPATION: 0
VOMITING: 1
ABDOMINAL PAIN: 1
COUGH: 0
DIARRHEA: 1
SHORTNESS OF BREATH: 0
BLOOD IN STOOL: 0
WHEEZING: 0
DIZZINESS: 0
FEVER: 1
NAUSEA: 1
SORE THROAT: 0
MYALGIAS: 1
HEADACHES: 0

## 2025-01-09 ASSESSMENT — PAIN SCALES - GENERAL: PAINLEVEL_OUTOF10: 8=MODERATE-SEVERE PAIN

## 2025-01-09 NOTE — LETTER
January 9, 2025    To Whom It May Concern:         This is confirmation that Negrito Rivera attended his scheduled appointment with Alise Matson P.A.-C. on 1/09/25. Please excuse work absences through 11/12/25 for medical reasons. Negrito can return to work without restrictions on 11/13/25 or earlier as long as symptoms have improved/resolved and there has been no fever for 24 hours.        Sincerely,    Alise Matson P.A.-C.  [phone]

## 2025-01-09 NOTE — PROGRESS NOTES
"Subjective     Negrito Rivera is a 24 y.o. male who presents with Emesis (X2 days ), Diarrhea, and Body Aches            Negrito is a 24 y.o. male who presents urgent care with nausea, vomiting and diarrhea.  Symptoms started yesterday.  He also reports symptoms of fever/chills and bodyaches.  Patient states that symptoms feel like they are starting to improve.  He has had loss of appetite due to symptoms.  He is trying to stay hydrated with liquid IV.  Patient is needing a work note.    Diarrhea   This is a new problem. The current episode started yesterday. The problem has been gradually improving. Associated symptoms include abdominal pain, chills, a fever, myalgias and vomiting. Pertinent negatives include no coughing or headaches. There are no known risk factors.       Review of Systems   Constitutional:  Positive for chills and fever.   HENT:  Negative for congestion, ear pain and sore throat.    Respiratory:  Negative for cough, shortness of breath and wheezing.    Cardiovascular:  Negative for chest pain and palpitations.   Gastrointestinal:  Positive for abdominal pain, diarrhea, nausea and vomiting. Negative for blood in stool, constipation and melena.   Musculoskeletal:  Positive for myalgias.   Neurological:  Negative for dizziness and headaches.   All other systems reviewed and are negative.             Objective     /82 (BP Location: Right arm, Patient Position: Sitting, BP Cuff Size: Adult)   Pulse (!) 102 Comment: provider made aware  Temp (!) 38.4 °C (101.2 °F) (Temporal) Comment: provider made aware  Resp 16   Ht 1.702 m (5' 7\")   Wt 88.5 kg (195 lb 3.2 oz)   SpO2 99%   BMI 30.57 kg/m²      Physical Exam  Vitals reviewed.   Constitutional:       Appearance: Normal appearance.   HENT:      Head: Normocephalic and atraumatic.      Nose: Nose normal.      Mouth/Throat:      Mouth: Mucous membranes are moist.      Pharynx: Oropharynx is clear.   Eyes:      Extraocular " Movements: Extraocular movements intact.      Conjunctiva/sclera: Conjunctivae normal.      Pupils: Pupils are equal, round, and reactive to light.   Cardiovascular:      Rate and Rhythm: Tachycardia present.   Pulmonary:      Effort: Pulmonary effort is normal.      Breath sounds: Normal breath sounds.   Abdominal:      General: Abdomen is flat. Bowel sounds are normal. There is no distension.      Palpations: Abdomen is soft.      Tenderness: There is generalized abdominal tenderness. There is no guarding or rebound.   Skin:     General: Skin is warm.   Neurological:      General: No focal deficit present.      Mental Status: He is alert and oriented to person, place, and time.                             Assessment & Plan        Assessment & Plan  Fever, unspecified fever cause         Nausea vomiting and diarrhea    Orders:    ondansetron (ZOFRAN ODT) 4 MG TABLET DISPERSIBLE; Take 1 Tablet by mouth every 6 hours as needed for Nausea/Vomiting.    Generalized body aches              Differential diagnoses, supportive care measures (rest, hydration, OTC Tylenol/ibuprofen, Zofran as needed for nausea/vomiting, bland diet) and indications for immediate follow-up discussed with patient. Pathogenesis of diagnosis discussed including typical length and natural progression.  See AVS.  Instructed to return to urgent care or nearest emergency department if symptoms fail to improve, for any change in condition, further concerns, or new concerning symptoms.    Patient states understanding and agrees with the plan of care and discharge instructions.

## 2025-02-07 NOTE — LETTER
June 28, 2022    To Whom It May Concern:         This is confirmation that Negrito Rivera attended his scheduled appointment with Yunior Jones P.A.-C. on 6/28/22.         If you have any questions please do not hesitate to call me at the phone number listed below. He was instructed to quarantine [place] CDC guidelines.      Sincerely,          Yunior Jones P.A.-C.  [phone]                  
No